# Patient Record
Sex: MALE | Race: WHITE
[De-identification: names, ages, dates, MRNs, and addresses within clinical notes are randomized per-mention and may not be internally consistent; named-entity substitution may affect disease eponyms.]

---

## 2018-07-26 ENCOUNTER — HOSPITAL ENCOUNTER (EMERGENCY)
Dept: HOSPITAL 80 - FED | Age: 43
Discharge: HOME | End: 2018-07-26
Payer: COMMERCIAL

## 2018-07-26 VITALS — DIASTOLIC BLOOD PRESSURE: 83 MMHG | SYSTOLIC BLOOD PRESSURE: 128 MMHG

## 2018-07-26 DIAGNOSIS — N20.0: Primary | ICD-10-CM

## 2018-07-26 DIAGNOSIS — E86.9: ICD-10-CM

## 2018-07-26 DIAGNOSIS — Z90.89: ICD-10-CM

## 2018-07-26 LAB — PLATELET # BLD: 286 10^3/UL (ref 150–400)

## 2018-07-26 NOTE — EDPHY
H & P


Time Seen by Provider: 18 20:53


HPI/ROS: 





HPI


Right-sided abdominal pain.





43-year-old male by private vehicle.  This patient reports sudden onset right-

sided abdominal pain described as cramping and aching and radiating to his 

right groin since 3:00 p.m..  He has had some nausea but no vomiting.  Denies 

gross hematuria.  Last bowel movement was earlier this morning.  Described as 

normal.  No bloody or melenic stool.  He has a history of a laparoscopic 

appendectomy.





ROS:





Constitutional:  No fever, no chills.  No weakness.


Eyes:  No discharge.  No changes in vision.


ENT:  No sore throat.  No nasal congestion or rhinorrhea.


Respiratory:  No cough.  No shortness of breath.


Cardiac:  No chest pain, no palpitations.


Gastrointestinal:  As above, no vomiting, no diarrhea.


Genitourinary:  No hematuria.  No dysuria or increased frequency with urination.


Musculoskeletal:  No back pain.  No neck pain.  No myalgias or arthralgias.


Skin:  No rashes.


Neurological:  No headache.  No focal weakness or altered sensation.





Past medical history:  Appendectomy, right knee infection.





Social history:  Nonsmoker.  He does have a prior history of narcotic drug 

abuse.  Denies alcohol.





Physical Exam:





General Appearance:  Alert, he appears uncomfortable but not in distress.  This 

patient is responding to questions appropriately and in full sentences.  This 

patient appears well-hydrated and well-nourished.


Eyes:  Pupils equal and round no pallor or injection.  No lid edema, erythema 

or injection.


Respiratory:  There are no retractions, lungs are clear to auscultation with 

good air movement bilaterally.


Cardiovascular:  Regular rate and rhythm.  No murmur.


Gastrointestinal:  Abdomen is soft with vague right-sided mid abdominal 

tenderness on palpation, no masses, bowel sounds normal.  No focal tenderness 

at McBurney's point.  No Mahan sign.


Neurological:  Motor sensory function is grossly intact.  Cranial nerves are 

normal.  Gait is normal.


Skin:  Warm and dry, no rashes.


Musculoskeletal:  No CVA tenderness bilaterally.


Extremities are symmetrical.  All joints range without pain or impingement.


Psychiatric:  No agitation.  No depression.





Database:





EKG:





Imaging:





CT scan of abdomen and pelvis with IV contrast:  Significant being a 4 and 

obstructive 3.2 mm by 6.2 mm ureteral stone at the level of L3-L4 with moderate 

to severe hydronephrosis.  Results were discussed with staff radiologist.





Procedures:





Emergency department course:





Triage vital signs reviewed.  He is moderately hypertensive.  Vital signs are 

otherwise normal.  IV was placed.  He was started on IV normal saline with 1 L 

to be given over the next hour.  He was initially given 1 mg of IV 

hydromorphone and 4 mg of IV Zofran for pain.





10:15 p.m., patient re-evaluated, results of CT scan discussed with him.  He 

states that his pain had improved but is now coming back.  He does appear 

uncomfortable.  He has a normal creatinine.  He was given 0.4 mg of oral Flomax

, 30 mg of IV Toradol and 1 mg of IV hydromorphone.





10:30 p.m., the patient was re-evaluated.  At this time his pain is well 

controlled.  I discussed admission with him for pain control and observation 

overnight.  He does not want to be admitted.  I will try to arrange for urology 

follow-up for him tomorrow morning.  Urology paged.





11:20 p.m., patient re-evaluated.  Urology will be able to see this patient in 

the office on Monday.  I explained to him that it will be best for us to admit 

him for pain control of the neurology consultation in the morning.  He does not 

want to do this because his son is visiting from [place].  He is asking to be 

discharged.  I have explained the reasoning for admission with his clear 

understanding.  Regardless he decides to go home.  In my professional pending 

as capacitance to make decisions.  I spoke with his girlfriend.  Her name is 

Bina.  Because of his history of narcotic pain medication abuse she will 

administer his hydrocodone tablets.  Return to emergency department precautions 

were discussed with both of them in detail.  Follow-up was reviewed thoroughly.

  All of their questions were answered.  He was discharged home in good 

condition with his girlfriend.





Differential Diagnosis:





The differential diagnosis on this patient includes but is not limited to 

ureterolithiasis, volvulus, constipation.  This represents a partial list of 

diagnoses considered.  These considerations are based on history, physical exam

, past history, reassessment and diagnostic testing.


Smoking Status: Never smoked


Constitutional: 


 Initial Vital Signs











Temperature (C)  36.4 C   07/26/18 20:27


 


Heart Rate  90   18 20:27


 


Respiratory Rate  16   18 20:27


 


Blood Pressure  153/105 H  18 20:27


 


O2 Sat (%)  100   18 20:27








 











O2 Delivery Mode               Room Air














Allergies/Adverse Reactions: 


 





Penicillins Allergy (Verified 16 16:20)


 








Home Medications: 














 Medication  Instructions  Recorded


 


ALPRAZolam [Xanax] 2 mg PO BID #22 tab 16


 


Hydrocodone/APAP 5/325 [Norco 1 - 2 tab PO Q4-6PRN PRN #10 tab 18





5/325 (*)]  


 


Ondansetron Odt [Zofran Odt 4 mg 4 mg PO Q4PRN PRN #10 tab 18





(*)]  


 


Tamsulosin HCl [Flomax 0.4 MG (*)] 0.4 mg PO DAILY #4 cap 18














Medical Decision Making





- Diagnostics


Imaging Results: 


 Imaging Impressions





Abdomen CT  18 21:20


Impression: 


Obstructing 3.2 x 6.0 mm calculus in the mid right ureter with moderate to 

severe hydronephrosis.


 


Findings and recommendations discussed with Laughlin B McCollester, MD  at 2205 

hour, 2018.














- Data Points


Laboratory Results: 


 Laboratory Results





 18 21:02 





 18 21:02 





 











  18





  23:00 21:02 21:02


 


WBC      





    


 


RBC      





    


 


Hgb      





    


 


Hct      





    


 


MCV      





    


 


MCH      





    


 


MCHC      





    


 


RDW      





    


 


Plt Count      





    


 


MPV      





    


 


Neut % (Auto)      





    


 


Lymph % (Auto)      





    


 


Mono % (Auto)      





    


 


Eos % (Auto)      





    


 


Baso % (Auto)      





    


 


Nucleat RBC Rel Count      





    


 


Absolute Neuts (auto)      





    


 


Absolute Lymphs (auto)      





    


 


Absolute Monos (auto)      





    


 


Absolute Eos (auto)      





    


 


Absolute Basos (auto)      





    


 


Absolute Nucleated RBC      





    


 


Immature Gran %      





    


 


Immature Gran #      





    


 


Sodium      137 mEq/L mEq/L





     (135-145) 


 


Potassium      3.8 mEq/L mEq/L





     (3.3-5.0) 


 


Chloride      102 mEq/L mEq/L





     () 


 


Carbon Dioxide      23 mEq/l mEq/l





     (22-31) 


 


Anion Gap      12 mEq/L mEq/L





     (8-16) 


 


BUN      15 mg/dL mg/dL





     (7-23) 


 


Creatinine      1.2 mg/dL mg/dL





     (0.7-1.3) 


 


Estimated GFR      > 60 





    


 


Glucose      95 mg/dL mg/dL





     () 


 


Calcium      9.4 mg/dL mg/dL





     (8.5-10.4) 


 


Total Bilirubin    0.5 mg/dL mg/dL  





    (0.1-1.4)  


 


Conjugated Bilirubin    0.2 mg/dL mg/dL  





    (0.0-0.5)  


 


Unconjugated Bilirubin    0.3 mg/dL mg/dL  





    (0.0-1.1)  


 


AST    26 IU/L IU/L  





    (17-59)  


 


ALT    24 IU/L IU/L  





    (21-72)  


 


Alkaline Phosphatase    72 IU/L IU/L  





    ()  


 


Total Protein    7.1 g/dL g/dL  





    (6.3-8.2)  


 


Albumin    4.6 g/dL g/dL  





    (3.5-5.0)  


 


Lipase    63 IU/L IU/L  





    ()  


 


Urine Color  PALE YELLOW     





    


 


Urine Appearance  CLEAR     





    


 


Urine pH  6.0     





   (5.0-7.5)   


 


Ur Specific Gravity  1.021     





   (1.002-1.030)   


 


Urine Protein  NEGATIVE     





   (NEGATIVE)   


 


Urine Ketones  TRACE  H     





   (NEGATIVE)   


 


Urine Blood  2+  H     





   (NEGATIVE)   


 


Urine Nitrate  NEGATIVE     





   (NEGATIVE)   


 


Urine Bilirubin  NEGATIVE     





   (NEGATIVE)   


 


Urine Urobilinogen  NEGATIVE EU EU    





   (0.2-1.0)   


 


Ur Leukocyte Esterase  NEGATIVE     





   (NEGATIVE)   


 


Urine RBC  5-10 /hpf H /hpf    





   (0-3)   


 


Urine WBC  1-3 /hpf /hpf    





   (0-3)   


 


Ur Epithelial Cells  NONE SEEN /lpf /lpf    





   (NONE-1+)   


 


Urine Glucose  NEGATIVE     





   (NEGATIVE)   














  18





  21:02


 


WBC  13.28 10^3/uL H 10^3/uL





   (3.80-9.50) 


 


RBC  4.56 10^6/uL 10^6/uL





   (4.40-6.38) 


 


Hgb  16.3 g/dL g/dL





   (13.7-17.5) 


 


Hct  45.1 % %





   (40.0-51.0) 


 


MCV  98.9 fL fL





   (81.5-99.8) 


 


MCH  35.7 pg H pg





   (27.9-34.1) 


 


MCHC  36.1 g/dL g/dL





   (32.4-36.7) 


 


RDW  12.1 % %





   (11.5-15.2) 


 


Plt Count  286 10^3/uL 10^3/uL





   (150-400) 


 


MPV  9.4 fL fL





   (8.7-11.7) 


 


Neut % (Auto)  78.6 % H %





   (39.3-74.2) 


 


Lymph % (Auto)  12.7 % L %





   (15.0-45.0) 


 


Mono % (Auto)  7.2 % %





   (4.5-13.0) 


 


Eos % (Auto)  0.5 % L %





   (0.6-7.6) 


 


Baso % (Auto)  0.3 % %





   (0.3-1.7) 


 


Nucleat RBC Rel Count  0.0 % %





   (0.0-0.2) 


 


Absolute Neuts (auto)  10.44 10^3/uL H 10^3/uL





   (1.70-6.50) 


 


Absolute Lymphs (auto)  1.69 10^3/uL 10^3/uL





   (1.00-3.00) 


 


Absolute Monos (auto)  0.96 10^3/uL H 10^3/uL





   (0.30-0.80) 


 


Absolute Eos (auto)  0.06 10^3/uL 10^3/uL





   (0.03-0.40) 


 


Absolute Basos (auto)  0.04 10^3/uL 10^3/uL





   (0.02-0.10) 


 


Absolute Nucleated RBC  0.00 10^3/uL 10^3/uL





   (0-0.01) 


 


Immature Gran %  0.7 % %





   (0.0-1.1) 


 


Immature Gran #  0.09 10^3/uL 10^3/uL





   (0.00-0.10) 


 


Sodium  





  


 


Potassium  





  


 


Chloride  





  


 


Carbon Dioxide  





  


 


Anion Gap  





  


 


BUN  





  


 


Creatinine  





  


 


Estimated GFR  





  


 


Glucose  





  


 


Calcium  





  


 


Total Bilirubin  





  


 


Conjugated Bilirubin  





  


 


Unconjugated Bilirubin  





  


 


AST  





  


 


ALT  





  


 


Alkaline Phosphatase  





  


 


Total Protein  





  


 


Albumin  





  


 


Lipase  





  


 


Urine Color  





  


 


Urine Appearance  





  


 


Urine pH  





  


 


Ur Specific Gravity  





  


 


Urine Protein  





  


 


Urine Ketones  





  


 


Urine Blood  





  


 


Urine Nitrate  





  


 


Urine Bilirubin  





  


 


Urine Urobilinogen  





  


 


Ur Leukocyte Esterase  





  


 


Urine RBC  





  


 


Urine WBC  





  


 


Ur Epithelial Cells  





  


 


Urine Glucose  





  











Medications Given: 


 








Discontinued Medications





Hydromorphone HCl (Dilaudid)  1 mg IVP EDNOW ONE


   Stop: 18 21:19


   Last Admin: 18 21:31 Dose:  1 mg


Hydromorphone HCl (Dilaudid)  1 mg IVP EDNOW ONE


   Stop: 18 22:15


   Last Admin: 18 22:24 Dose:  1 mg


Sodium Chloride (Ns)  1,000 mls @ 0 mls/hr IV EDNOW ONE; Wide Open


   PRN Reason: Protocol


   Stop: 18 21:19


   Last Admin: 18 21:31 Dose:  1,000 mls


Ketorolac Tromethamine (Toradol)  30 mg IVP EDNOW ONE


   Stop: 18 22:11


   Last Admin: 18 22:24 Dose:  30 mg


Ondansetron HCl (Zofran)  4 mg IVP EDNOW ONE


   Stop: 18 21:19


   Last Admin: 18 21:31 Dose:  4 mg


Tamsulosin HCl (Flomax)  0.4 mg PO EDNOW ONE


   Stop: 18 22:11


   Last Admin: 18 22:25 Dose:  0.4 mg








Departure





- Departure


Disposition: Home, Routine, Self-Care


Clinical Impression: 


 Kidney stone on right side





Condition: Good


Instructions:  Kidney Stones (ED)


Additional Instructions: 


Read and follow provided instructions.





Follow-up with Urology tomorrow if possible but at the latest on Monday for re-

evaluation and further management.





Narcotic pain medication:  1-2 every 4-6 hours as needed for pain.





You can start taking ibuprofen tomorrow morning:  Ibuprofen dosin mg 

every 6 hours with meals for the next 3 days only.  Take only as needed for 

pain.





Take all medications as prescribed only.





Return to the emergency department for worsening or uncontrolled pain, fever, 

vomiting or other serious concerns.


Referrals: 


Eli Akins MD [Medical Doctor] - As per Instructions


Prescriptions: 


Hydrocodone/APAP 5/325 [Norco 5/325 (*)] 1 - 2 tab PO Q4-6PRN PRN #10 tab


 PRN Reason: Pain, Moderate


Ondansetron Odt [Zofran Odt 4 mg (*)] 4 mg PO Q4PRN PRN #10 tab


 PRN Reason: For Nausea & Vomiting


Tamsulosin HCl [Flomax 0.4 MG (*)] 0.4 mg PO DAILY #4 cap

## 2018-07-27 NOTE — ASMTCMCOM
CM Note

 

CM Note                       

Notes:

Follow up call s/p ER visisit yesterday, 07/26/18.  Patient has contacted Dr. Akins 

(urology) and is scheduled for procedure/surgery on Monday, 07/30 @ 9042.  Patient verbalizes signs 


and symptoms for returning to the ER prn.  I briefly spoke to patient about his "recovery" status 

from narcotics.  Patient confirms that his girlfriend is distributing his meds prn and that he has 

informed the urologist of his history as well

 

Date Signed:  07/27/2018 03:54 PM

Electronically Signed By:Maritza Shell RN